# Patient Record
Sex: FEMALE | Race: WHITE | NOT HISPANIC OR LATINO | URBAN - METROPOLITAN AREA
[De-identification: names, ages, dates, MRNs, and addresses within clinical notes are randomized per-mention and may not be internally consistent; named-entity substitution may affect disease eponyms.]

---
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
## 2019-01-11 ENCOUNTER — RX RENEWAL (OUTPATIENT)
Age: 19
End: 2019-01-11

## 2019-02-06 ENCOUNTER — RX RENEWAL (OUTPATIENT)
Age: 19
End: 2019-02-06

## 2019-03-04 ENCOUNTER — RX RENEWAL (OUTPATIENT)
Age: 19
End: 2019-03-04

## 2019-04-05 ENCOUNTER — RX RENEWAL (OUTPATIENT)
Age: 19
End: 2019-04-05

## 2019-05-06 ENCOUNTER — APPOINTMENT (OUTPATIENT)
Dept: OBGYN | Facility: CLINIC | Age: 19
End: 2019-05-06

## 2019-06-25 ENCOUNTER — TRANSCRIPTION ENCOUNTER (OUTPATIENT)
Age: 19
End: 2019-06-25

## 2019-07-10 ENCOUNTER — RX RENEWAL (OUTPATIENT)
Age: 19
End: 2019-07-10

## 2019-07-19 ENCOUNTER — TRANSCRIPTION ENCOUNTER (OUTPATIENT)
Age: 19
End: 2019-07-19

## 2019-07-26 ENCOUNTER — APPOINTMENT (OUTPATIENT)
Dept: OBGYN | Facility: CLINIC | Age: 19
End: 2019-07-26
Payer: COMMERCIAL

## 2019-07-26 VITALS
SYSTOLIC BLOOD PRESSURE: 96 MMHG | DIASTOLIC BLOOD PRESSURE: 70 MMHG | WEIGHT: 130 LBS | BODY MASS INDEX: 22.2 KG/M2 | HEIGHT: 64 IN

## 2019-07-26 PROCEDURE — 99214 OFFICE O/P EST MOD 30 MIN: CPT

## 2020-01-31 ENCOUNTER — APPOINTMENT (OUTPATIENT)
Dept: OBGYN | Facility: CLINIC | Age: 20
End: 2020-01-31
Payer: COMMERCIAL

## 2020-01-31 ENCOUNTER — APPOINTMENT (OUTPATIENT)
Dept: OBGYN | Facility: CLINIC | Age: 20
End: 2020-01-31

## 2020-01-31 VITALS
SYSTOLIC BLOOD PRESSURE: 90 MMHG | BODY MASS INDEX: 22.2 KG/M2 | HEIGHT: 64 IN | DIASTOLIC BLOOD PRESSURE: 60 MMHG | WEIGHT: 130 LBS

## 2020-01-31 DIAGNOSIS — Z30.019 ENCOUNTER FOR INITIAL PRESCRIPTION OF CONTRACEPTIVES, UNSPECIFIED: ICD-10-CM

## 2020-01-31 PROCEDURE — 99213 OFFICE O/P EST LOW 20 MIN: CPT

## 2020-01-31 RX ORDER — NORGESTIMATE AND ETHINYL ESTRADIOL 0.25-0.035
0.25-35 KIT ORAL DAILY
Qty: 90 | Refills: 0 | Status: DISCONTINUED | COMMUNITY
Start: 2018-11-12 | End: 2020-01-31

## 2020-01-31 RX ORDER — NORGESTIMATE AND ETHINYL ESTRADIOL 0.25-0.035
0.25-35 KIT ORAL DAILY
Qty: 30 | Refills: 0 | Status: DISCONTINUED | COMMUNITY
Start: 2019-06-20 | End: 2020-01-31

## 2020-01-31 RX ORDER — BUPROPION HYDROCHLORIDE 300 MG/1
300 TABLET, EXTENDED RELEASE ORAL DAILY
Qty: 30 | Refills: 6 | Status: DISCONTINUED | COMMUNITY
Start: 2019-04-24 | End: 2020-01-31

## 2020-01-31 RX ORDER — FLUOXETINE HYDROCHLORIDE 20 MG/1
20 TABLET ORAL DAILY
Qty: 30 | Refills: 0 | Status: DISCONTINUED | COMMUNITY
Start: 2018-07-11 | End: 2020-01-31

## 2020-01-31 RX ORDER — FLUOXETINE HYDROCHLORIDE 20 MG/1
20 CAPSULE ORAL
Qty: 30 | Refills: 2 | Status: DISCONTINUED | COMMUNITY
Start: 2018-08-18 | End: 2020-01-31

## 2020-01-31 RX ORDER — BUPROPION HYDROCHLORIDE 300 MG/1
300 TABLET, EXTENDED RELEASE ORAL DAILY
Qty: 30 | Refills: 0 | Status: DISCONTINUED | COMMUNITY
Start: 2018-11-12 | End: 2020-01-31

## 2020-01-31 RX ORDER — ETONOGESTREL AND ETHINYL ESTRADIOL .12; .015 MG/D; MG/D
0.12-0.015 INSERT, EXTENDED RELEASE VAGINAL
Qty: 3 | Refills: 1 | Status: DISCONTINUED | COMMUNITY
Start: 2019-07-26 | End: 2020-01-31

## 2020-01-31 RX ORDER — NORGESTIMATE AND ETHINYL ESTRADIOL 0.25-0.035
0.25-35 KIT ORAL
Qty: 1 | Refills: 1 | Status: DISCONTINUED | COMMUNITY
Start: 2018-09-28 | End: 2020-01-31

## 2020-01-31 NOTE — CHIEF COMPLAINT
[Follow Up] : follow up GYN visit [FreeTextEntry1] : 20 y/o G0 with complaint that all birth control makes her pope and other annoying side effects. SHe has high anxiety about ending up with an unintended pregnancy but hates all the forms of hormonal birth control she has tried. Dr. Quintanilla had given her prozac and wellbutrin to stablize her mood but the did not work and she felt it was because of the ocp but recenlty she has been off all hormonal birth control and is seeing a counselor and is still having some depression symptoms.\par Also she has questions regarding her vaginal secretions and whether they are normal. They vary a lot in consistency and color-clear to white. She sometimes notices a bad odor and wonders if this is normal. \par She denies any itching or burning

## 2020-02-28 ENCOUNTER — TRANSCRIPTION ENCOUNTER (OUTPATIENT)
Age: 20
End: 2020-02-28

## 2020-07-14 ENCOUNTER — TRANSCRIPTION ENCOUNTER (OUTPATIENT)
Age: 20
End: 2020-07-14

## 2020-07-15 ENCOUNTER — TRANSCRIPTION ENCOUNTER (OUTPATIENT)
Age: 20
End: 2020-07-15

## 2020-07-15 ENCOUNTER — APPOINTMENT (OUTPATIENT)
Dept: OBGYN | Facility: CLINIC | Age: 20
End: 2020-07-15
Payer: COMMERCIAL

## 2020-07-15 VITALS
BODY MASS INDEX: 22.2 KG/M2 | HEIGHT: 64 IN | DIASTOLIC BLOOD PRESSURE: 60 MMHG | TEMPERATURE: 98.1 F | SYSTOLIC BLOOD PRESSURE: 98 MMHG | WEIGHT: 130 LBS

## 2020-07-15 DIAGNOSIS — N89.8 OTHER SPECIFIED NONINFLAMMATORY DISORDERS OF VAGINA: ICD-10-CM

## 2020-07-15 PROCEDURE — 99213 OFFICE O/P EST LOW 20 MIN: CPT

## 2020-07-15 RX ORDER — LEVONORGESTREL 1.5 MG/1
1.5 TABLET ORAL
Qty: 1 | Refills: 1 | Status: DISCONTINUED | COMMUNITY
Start: 2020-01-31 | End: 2020-07-15

## 2020-07-15 NOTE — CHIEF COMPLAINT
[Urgent Visit] : Urgent Visit [FreeTextEntry1] : Pt reports she had s/s of yeast infection last week, self treated with monostat last week. States she itching and burned went away but now has discharge. Denies s/s UTI today\par \par Pt reports L breast pain x 2 days, denies nipple discharge, masses. LMP 3 weeks ago

## 2020-07-15 NOTE — COUNSELING
[STD (testing, results, tx)] : STD (testing, results, tx) [Contraception] : contraception [Emergency Contraception] : emergency contraception [Vulvar Hygiene] : vulvar hygiene

## 2020-07-15 NOTE — PHYSICAL EXAM
[Awake] : awake [Acute Distress] : no acute distress [Alert] : alert [Nipple Discharge] : no nipple discharge [Tender] : no tenderness [Mass] : no breast mass [Axillary LAD] : no axillary lymphadenopathy [Depressed Mood] : not depressed [Oriented x3] : oriented to person, place, and time [Flat Affect] : affect not flat [Labia Majora] : labia major [Labia Minora] : labia minora [Scant] : there was scant vaginal bleeding [Normal] : clitoris [Motion Tenderness] : there was no cervical motion tenderness [Tenderness] : nontender [Ovarian Mass (___ Cm)] : there were no adnexal masses [Uterine Adnexae] : were not tender and not enlarged [Adnexa Tenderness] : were not tender [FreeTextEntry5] : friable

## 2020-07-23 LAB
C TRACH RRNA SPEC QL NAA+PROBE: NOT DETECTED
CANDIDA VAG CYTO: NOT DETECTED
G VAGINALIS+PREV SP MTYP VAG QL MICRO: NOT DETECTED
N GONORRHOEA RRNA SPEC QL NAA+PROBE: NOT DETECTED
SOURCE AMPLIFICATION: NORMAL
T VAGINALIS VAG QL WET PREP: NOT DETECTED

## 2020-10-15 ENCOUNTER — APPOINTMENT (OUTPATIENT)
Dept: OBGYN | Facility: CLINIC | Age: 20
End: 2020-10-15
Payer: COMMERCIAL

## 2020-10-15 VITALS
DIASTOLIC BLOOD PRESSURE: 58 MMHG | HEIGHT: 64 IN | SYSTOLIC BLOOD PRESSURE: 100 MMHG | WEIGHT: 140 LBS | BODY MASS INDEX: 23.9 KG/M2

## 2020-10-15 PROCEDURE — 99395 PREV VISIT EST AGE 18-39: CPT

## 2020-10-15 NOTE — DISCUSSION/SUMMARY
[FreeTextEntry1] : Pap today\par Rev'd condoms for birth control, STI protection.\par Pt to rto in 1 year or sooner prn

## 2020-10-15 NOTE — PHYSICAL EXAM
[Appropriately responsive] : appropriately responsive [Alert] : alert [No Acute Distress] : no acute distress [No Lymphadenopathy] : no lymphadenopathy [Regular Rate Rhythm] : regular rate rhythm [No Murmurs] : no murmurs [Soft] : soft [Clear to Auscultation B/L] : clear to auscultation bilaterally [Non-tender] : non-tender [Non-distended] : non-distended [No HSM] : No HSM [Oriented x3] : oriented x3 [Examination Of The Breasts] : a normal appearance [No Masses] : no breast masses were palpable [Labia Majora] : normal [Labia Minora] : normal [Uterine Adnexae] : normal [Normal] : normal

## 2020-10-15 NOTE — REASON FOR VISIT
[Annual] : an annual visit. [FreeTextEntry2] : Pt reports thin vaginal dischrge, clear, pt denies itching, burning, odor, no new sexual partners

## 2020-10-21 LAB — CYTOLOGY CVX/VAG DOC THIN PREP: ABNORMAL

## 2020-12-04 ENCOUNTER — OUTPATIENT (OUTPATIENT)
Dept: OUTPATIENT SERVICES | Facility: HOSPITAL | Age: 20
LOS: 1 days | End: 2020-12-04

## 2020-12-10 ENCOUNTER — TRANSCRIPTION ENCOUNTER (OUTPATIENT)
Age: 20
End: 2020-12-10

## 2020-12-14 DIAGNOSIS — Z01.818 ENCOUNTER FOR OTHER PREPROCEDURAL EXAMINATION: ICD-10-CM

## 2020-12-15 ENCOUNTER — APPOINTMENT (OUTPATIENT)
Dept: DISASTER EMERGENCY | Facility: CLINIC | Age: 20
End: 2020-12-15

## 2020-12-18 ENCOUNTER — OUTPATIENT (OUTPATIENT)
Dept: OUTPATIENT SERVICES | Facility: HOSPITAL | Age: 20
LOS: 1 days | End: 2020-12-18

## 2020-12-21 ENCOUNTER — TRANSCRIPTION ENCOUNTER (OUTPATIENT)
Age: 20
End: 2020-12-21

## 2021-02-21 ENCOUNTER — TRANSCRIPTION ENCOUNTER (OUTPATIENT)
Age: 21
End: 2021-02-21

## 2021-08-26 ENCOUNTER — APPOINTMENT (OUTPATIENT)
Dept: OBGYN | Facility: CLINIC | Age: 21
End: 2021-08-26
Payer: COMMERCIAL

## 2021-08-26 ENCOUNTER — NON-APPOINTMENT (OUTPATIENT)
Age: 21
End: 2021-08-26

## 2021-08-26 VITALS
HEIGHT: 64 IN | SYSTOLIC BLOOD PRESSURE: 116 MMHG | DIASTOLIC BLOOD PRESSURE: 82 MMHG | WEIGHT: 140 LBS | BODY MASS INDEX: 23.9 KG/M2

## 2021-08-26 DIAGNOSIS — Z87.42 PERSONAL HISTORY OF OTHER DISEASES OF THE FEMALE GENITAL TRACT: ICD-10-CM

## 2021-08-26 DIAGNOSIS — Z30.09 ENCOUNTER FOR OTHER GENERAL COUNSELING AND ADVICE ON CONTRACEPTION: ICD-10-CM

## 2021-08-26 DIAGNOSIS — R68.82 DECREASED LIBIDO: ICD-10-CM

## 2021-08-26 PROCEDURE — 99213 OFFICE O/P EST LOW 20 MIN: CPT

## 2021-08-26 NOTE — PLAN
[FreeTextEntry1] : Pt reports she had used plan B after unprotected intercourse, usually uses condoms consistently. Discussed reliable birth control method. Pt declines at this time, also discussed worry about pregnancy can have negative effect on libido.\par Long discussion regarding libido, pt reports her libido decreased when she started depression medication x 2 years ago but did not recover after stopping medication. I acknowledged this could be possible but hard to confirm, pt see PCP next week.\par Also discussed emotional arousal and physical arousal, suggest to try stimulation.\par Acupuncture also suggestion.\par RTO in 1 year or sooner prn.\par \par Vania Alvarez CM

## 2021-08-26 NOTE — HISTORY OF PRESENT ILLNESS
[Yes] : Patient has concerns regarding sex [Men] : men [PapSmeardate] : 2020 [TextBox_31] : inflammation [FreeTextEntry1] : decreased libido

## 2021-08-26 NOTE — REASON FOR VISIT
[Follow-Up] : a follow-up evaluation of [FreeTextEntry2] : Pt reports decreased libido and repeat pap, inflammation on NILM pap 10/2020

## 2021-08-26 NOTE — PHYSICAL EXAM
[Appropriately responsive] : appropriately responsive [Alert] : alert [No Acute Distress] : no acute distress [Oriented x3] : oriented x3 [Labia Majora] : normal [Labia Minora] : normal [No Bleeding] : There was no active vaginal bleeding [Normal] : normal [Uterine Adnexae] : normal [FreeTextEntry5] : friable

## 2021-09-02 LAB — CYTOLOGY CVX/VAG DOC THIN PREP: NORMAL

## 2021-09-16 ENCOUNTER — TRANSCRIPTION ENCOUNTER (OUTPATIENT)
Age: 21
End: 2021-09-16

## 2021-10-22 ENCOUNTER — APPOINTMENT (OUTPATIENT)
Dept: OBGYN | Facility: CLINIC | Age: 21
End: 2021-10-22
Payer: COMMERCIAL

## 2021-10-22 VITALS
SYSTOLIC BLOOD PRESSURE: 112 MMHG | HEIGHT: 64 IN | BODY MASS INDEX: 23.9 KG/M2 | DIASTOLIC BLOOD PRESSURE: 76 MMHG | WEIGHT: 140 LBS

## 2021-10-22 DIAGNOSIS — B96.89 ACUTE VAGINITIS: ICD-10-CM

## 2021-10-22 DIAGNOSIS — R30.0 DYSURIA: ICD-10-CM

## 2021-10-22 DIAGNOSIS — N76.0 ACUTE VAGINITIS: ICD-10-CM

## 2021-10-22 DIAGNOSIS — Z11.3 ENCOUNTER FOR SCREENING FOR INFECTIONS WITH A PREDOMINANTLY SEXUAL MODE OF TRANSMISSION: ICD-10-CM

## 2021-10-22 LAB
BILIRUB UR QL STRIP: NORMAL
CLARITY UR: CLEAR
COLLECTION METHOD: NORMAL
GLUCOSE UR-MCNC: NORMAL
HCG UR QL: 0.2 EU/DL
HGB UR QL STRIP.AUTO: NORMAL
KETONES UR-MCNC: NORMAL
LEUKOCYTE ESTERASE UR QL STRIP: NORMAL
NITRITE UR QL STRIP: NORMAL
PH UR STRIP: 5.5
PROT UR STRIP-MCNC: NORMAL
SP GR UR STRIP: <=1.005

## 2021-10-22 PROCEDURE — 99213 OFFICE O/P EST LOW 20 MIN: CPT

## 2021-10-22 RX ORDER — METRONIDAZOLE 7.5 MG/G
0.75 GEL VAGINAL
Qty: 1 | Refills: 1 | Status: ACTIVE | COMMUNITY
Start: 2021-10-22 | End: 1900-01-01

## 2021-10-22 NOTE — CHIEF COMPLAINT
[Urgent Visit] : Urgent Visit [FreeTextEntry1] : Pt reports vaginal itching x 4 days, used OTC monostat x 3 days but itching increased.\par Pt states she also left tampon in on the last day of her menses for over 9 hours.\par

## 2021-10-22 NOTE — HISTORY OF PRESENT ILLNESS
[Burning] : burning [Itching] : itching [Discharge] : discharge [] : the vaginal discharge is described as [Rt Vulva] : right vulva [Lt Vulva] : left vulva [Vagina] : vagina [Sexually Active] : is sexually active [Monogamous] : is monogamous [Male ___] : [unfilled] male [Mass] : no mass [Lesion] : no lesion [Soreness] : no soreness [Hx HSV] : no herpes simplex [Contraception] : does not use contraception

## 2021-10-22 NOTE — COUNSELING
[Exercise] : exercise [Vitamins/Supplements] : vitamins/supplements [STD (testing, results, tx)] : STD (testing, results, tx) [Contraception] : contraception [Emergency Contraception] : emergency contraception [Safe Sexual Practices] : safe sexual practices [Vulvar Hygiene] : vulvar hygiene

## 2021-10-22 NOTE — PHYSICAL EXAM
[No Lesions] : no genitalia lesions [Vulvitis] : vulvitis [Labia Majora Erythema] : erythema of the labia majora [Labia Minora Erythema] : erythema of the labia minora [Normal] : clitoris [Discharge] : a  ~M vaginal discharge was present [Scant] : scant [Foul Smelling] : foul smelling [Rosa Elena] : yellow [Thin] : thin [No Bleeding] : there was no active vaginal bleeding [Uterine Adnexae] : were not tender and not enlarged [Motion Tenderness] : there was no cervical motion tenderness [Tenderness] : nontender [Adnexa Tenderness] : were not tender

## 2021-11-05 ENCOUNTER — APPOINTMENT (OUTPATIENT)
Dept: OBGYN | Facility: CLINIC | Age: 21
End: 2021-11-05

## 2021-11-08 ENCOUNTER — APPOINTMENT (OUTPATIENT)
Dept: OBGYN | Facility: CLINIC | Age: 21
End: 2021-11-08

## 2021-11-08 ENCOUNTER — APPOINTMENT (OUTPATIENT)
Dept: OBGYN | Facility: CLINIC | Age: 21
End: 2021-11-08
Payer: COMMERCIAL

## 2021-11-08 VITALS
SYSTOLIC BLOOD PRESSURE: 122 MMHG | BODY MASS INDEX: 23.9 KG/M2 | HEIGHT: 64 IN | WEIGHT: 140 LBS | DIASTOLIC BLOOD PRESSURE: 78 MMHG

## 2021-11-08 DIAGNOSIS — N94.9 UNSPECIFIED CONDITION ASSOCIATED WITH FEMALE GENITAL ORGANS AND MENSTRUAL CYCLE: ICD-10-CM

## 2021-11-08 LAB
BILIRUB UR QL STRIP: NORMAL
CLARITY UR: CLEAR
COLLECTION METHOD: NORMAL
GLUCOSE UR-MCNC: NORMAL
HCG UR QL: 0.2 EU/DL
HCG UR QL: NEGATIVE
HGB UR QL STRIP.AUTO: NORMAL
KETONES UR-MCNC: NORMAL
LEUKOCYTE ESTERASE UR QL STRIP: NORMAL
NITRITE UR QL STRIP: NORMAL
PH UR STRIP: 5.5
PROT UR STRIP-MCNC: NORMAL
QUALITY CONTROL: NORMAL
SP GR UR STRIP: 1.03

## 2021-11-08 PROCEDURE — 99213 OFFICE O/P EST LOW 20 MIN: CPT

## 2021-11-08 RX ORDER — PREDNISONE 20 MG/1
20 TABLET ORAL
Qty: 10 | Refills: 0 | Status: ACTIVE | COMMUNITY
Start: 2021-09-16

## 2021-11-08 RX ORDER — FLUCONAZOLE 150 MG/1
150 TABLET ORAL
Qty: 1 | Refills: 0 | Status: ACTIVE | COMMUNITY
Start: 2021-11-08 | End: 1900-01-01

## 2021-11-08 NOTE — DISCUSSION/SUMMARY
[FreeTextEntry1] : ANother BD Affirm being sent-this time pt has placed nothing in vagina for several days\par txing with one Diflucan tablet

## 2021-11-08 NOTE — HISTORY OF PRESENT ILLNESS
[FreeTextEntry1] : Pt was seen 10/22 for vaginal burning she had self treated herself with Monistat 3 before that appointment. She then was treated presumptively with Metrogel and now feels itchy externally only\par BD Affirm was negative

## 2021-11-08 NOTE — PHYSICAL EXAM
[Labia Majora] : normal [Normal] : normal [FreeTextEntry1] : mild external irritation [FreeTextEntry4] : thin brown secretions-pt due to get menses

## 2021-11-10 LAB
CANDIDA VAG CYTO: NOT DETECTED
G VAGINALIS+PREV SP MTYP VAG QL MICRO: NOT DETECTED
T VAGINALIS VAG QL WET PREP: NOT DETECTED

## 2021-11-11 ENCOUNTER — NON-APPOINTMENT (OUTPATIENT)
Age: 21
End: 2021-11-11

## 2021-12-27 ENCOUNTER — TRANSCRIPTION ENCOUNTER (OUTPATIENT)
Age: 21
End: 2021-12-27

## 2022-03-11 ENCOUNTER — NON-APPOINTMENT (OUTPATIENT)
Age: 22
End: 2022-03-11

## 2022-03-11 ENCOUNTER — TRANSCRIPTION ENCOUNTER (OUTPATIENT)
Age: 22
End: 2022-03-11

## 2022-04-21 ENCOUNTER — APPOINTMENT (OUTPATIENT)
Dept: OBGYN | Facility: CLINIC | Age: 22
End: 2022-04-21
Payer: COMMERCIAL

## 2022-04-21 VITALS
HEIGHT: 64 IN | DIASTOLIC BLOOD PRESSURE: 80 MMHG | BODY MASS INDEX: 23.22 KG/M2 | WEIGHT: 136 LBS | SYSTOLIC BLOOD PRESSURE: 120 MMHG

## 2022-04-21 DIAGNOSIS — R68.82 DECREASED LIBIDO: ICD-10-CM

## 2022-04-21 DIAGNOSIS — N92.3 OVULATION BLEEDING: ICD-10-CM

## 2022-04-21 LAB
HCG UR QL: NEGATIVE
QUALITY CONTROL: YES

## 2022-04-21 PROCEDURE — 99214 OFFICE O/P EST MOD 30 MIN: CPT

## 2022-04-21 NOTE — HISTORY OF PRESENT ILLNESS
[FreeTextEntry1] : 20 y/o familiar to me presents with two issues\par 1) IMB-pt reports she got menses predictably monthly until November when suddenly it was coming late-up to 11 days and then a couple of months she reports she got it q 2 weeks. She noticed that this started after she received the COIVD vaccine in September and OCtober. She has not gotten the booster and is reluctant to do so because of this IMB. She has been taking multiple pregnancy tests during this time and all have been negative. She  uses condoms for birth control only- declines other methods-does not like ocps\par 2) decreased libido ongoing for over 4 years now. Reports she never had a strong sex drive but now she has none. She reports that Dr. AMARAL put her on Prozac for it which made it worse and the Wellbutrin which didn’t help either however she thinks she may not have been on it long enough\par She admits that she is possibly depressed. Had been seeing a psychologist but when COVID came she found the distance counseling was not helping her so she DC'd. She functions but is not happy,finishes college in 8 months and has a lot of stress. Denies SI/HI.\par She denies relationship issues with her partner\par She does not have poor self body image\par She is not on any medications

## 2022-04-21 NOTE — DISCUSSION/SUMMARY
[FreeTextEntry1] : 21 y.o with 2 issues\par 1) IMB for 5 months, never occurred before with normal physical exam\par -rx'd pelvic sono\par -pt has seen primary MD and thyroid is wnl\par 2)decreased libido\par -advised to try [employer] to explore reasons for low libido and possible ways to remedy\par -start Wellbutrin 150 mg QD- 3 months sent\par Pt will return for f/u visit with possible medication increase if needed\par Total time spent 30 minutes of which 25 was counseling

## 2022-04-21 NOTE — PHYSICAL EXAM
[Chaperone Declined] : Patient declined chaperone [Labia Majora] : normal [Labia Minora] : normal [Normal] : normal [Normal Position] : in a normal position [Uterine Adnexae] : normal

## 2022-04-25 ENCOUNTER — APPOINTMENT (OUTPATIENT)
Dept: OBGYN | Facility: CLINIC | Age: 22
End: 2022-04-25
Payer: COMMERCIAL

## 2022-04-25 ENCOUNTER — ASOB RESULT (OUTPATIENT)
Age: 22
End: 2022-04-25

## 2022-04-25 PROCEDURE — 76856 US EXAM PELVIC COMPLETE: CPT | Mod: 59

## 2022-04-25 PROCEDURE — 76830 TRANSVAGINAL US NON-OB: CPT

## 2022-06-29 RX ORDER — BUPROPION HYDROCHLORIDE 150 MG/1
150 TABLET, EXTENDED RELEASE ORAL DAILY
Qty: 90 | Refills: 3 | Status: ACTIVE | COMMUNITY
Start: 2022-04-21 | End: 1900-01-01

## 2022-10-14 ENCOUNTER — NON-APPOINTMENT (OUTPATIENT)
Age: 22
End: 2022-10-14

## 2022-10-14 ENCOUNTER — APPOINTMENT (OUTPATIENT)
Dept: NEUROLOGY | Facility: CLINIC | Age: 22
End: 2022-10-14
Payer: COMMERCIAL

## 2022-10-14 VITALS
SYSTOLIC BLOOD PRESSURE: 123 MMHG | HEART RATE: 118 BPM | BODY MASS INDEX: 22.49 KG/M2 | TEMPERATURE: 98.7 F | DIASTOLIC BLOOD PRESSURE: 88 MMHG | WEIGHT: 135 LBS | OXYGEN SATURATION: 98 % | HEIGHT: 65 IN

## 2022-10-14 DIAGNOSIS — R51.9 HEADACHE, UNSPECIFIED: ICD-10-CM

## 2022-10-14 DIAGNOSIS — E34.8 OTHER SPECIFIED ENDOCRINE DISORDERS: ICD-10-CM

## 2022-10-14 DIAGNOSIS — Z80.51 FAMILY HISTORY OF MALIGNANT NEOPLASM OF KIDNEY: ICD-10-CM

## 2022-10-14 DIAGNOSIS — Z82.3 FAMILY HISTORY OF STROKE: ICD-10-CM

## 2022-10-14 DIAGNOSIS — Z78.9 OTHER SPECIFIED HEALTH STATUS: ICD-10-CM

## 2022-10-14 DIAGNOSIS — Z82.49 FAMILY HISTORY OF ISCHEMIC HEART DISEASE AND OTHER DISEASES OF THE CIRCULATORY SYSTEM: ICD-10-CM

## 2022-10-14 PROCEDURE — 99204 OFFICE O/P NEW MOD 45 MIN: CPT

## 2022-10-14 RX ORDER — METOCLOPRAMIDE 10 MG/1
10 TABLET ORAL
Qty: 1 | Refills: 0 | Status: ACTIVE | COMMUNITY
Start: 2022-10-14 | End: 1900-01-01

## 2022-10-14 RX ORDER — DIVALPROEX SODIUM 500 MG/1
500 TABLET, DELAYED RELEASE ORAL
Qty: 1 | Refills: 0 | Status: ACTIVE | COMMUNITY
Start: 2022-10-14 | End: 1900-01-01

## 2022-10-14 RX ORDER — DEXAMETHASONE 4 MG/1
4 TABLET ORAL
Qty: 1 | Refills: 0 | Status: ACTIVE | COMMUNITY
Start: 2022-10-14 | End: 1900-01-01

## 2022-10-14 RX ORDER — DEXAMETHASONE 6 MG/1
6 TABLET ORAL
Qty: 1 | Refills: 0 | Status: ACTIVE | COMMUNITY
Start: 2022-10-14 | End: 1900-01-01

## 2022-10-14 NOTE — HISTORY OF PRESENT ILLNESS
[FreeTextEntry1] : Ms. Sanchez is a 22 year-old woman with PMH depression, pineal cyst who presents to the office today for evaluation of headaches. MRI brain w/wo performed in January 2016 demonstrated a Pineal gland and was otherwise unremarkable. She reports history of headaches and migraines but reports worsening severity and frequency over the past 2.5 weeks. She states that she experiences a headache almost every day with severe, migraine like headaches occurring about 4x/week. She is unable to adequately characterize the pain but states that headaches generally occur either in the front or back of her head and are associated with light sensitivity, sound sensitivity, dizziness and when headaches are severe, nausea and vomiting. She denies vision changes or aura.  She is using OTC NSAIDs and Tylenol with little relief. She was prescribed a medication by a previous neurologist that caused her to feel like her throat was swollen but does not remember the name. She denies any other new or concerning neurological symptoms. \par

## 2022-10-14 NOTE — REVIEW OF SYSTEMS
[Dizziness] : dizziness [Fever] : no fever [Chills] : no chills [Confused or Disoriented] : no confusion [Memory Lapses or Loss] : no memory loss [Decr. Concentrating Ability] : no decrease in concentrating ability [Difficulty with Language] : no ~M difficulty with language [Facial Weakness] : no facial weakness [Arm Weakness] : no arm weakness [Hand Weakness] : no hand weakness [Leg Weakness] : no leg weakness [Poor Coordination] : good coordination [Numbness] : no numbness [Tingling] : no tingling [Difficulty Walking] : no difficulty walking [Frequent Falls] : not falling [Anxiety] : no anxiety [Depression] : no depression [Eye Pain] : no eye pain [Eyesight Problems] : no eyesight problems [Earache] : no earache [Loss Of Hearing] : no hearing loss [Chest Pain] : no chest pain [Palpitations] : no palpitations [Shortness Of Breath] : no shortness of breath [Cough] : no cough

## 2022-10-14 NOTE — DISCUSSION/SUMMARY
[FreeTextEntry1] : Ms. Sanchez is a 22 year-old woman with PMH depression, pineal cyst who presents to the office today for evaluation of headaches, which are presumably migrainous. Due to patients symptoms and hx of pineal cyst, I recommend performing MRI head to r/o intracranial pathology as cause of increased frequency and severity of headaches. We discussed treatment options of abortive therapy and preventative therapy and addressed the importance of limiting abortive treatments to no more than 3x/week to prevent medication overuse headaches. Take Reglan 10mg, VPA 500mg and Decadron 10mg x 1 for status migrainosus. Begin rizatriptan 10mg PRN for migraines. We will readdress in 1-2 weeks after these medications are taken and imaging is performed to see if prophylaxis is needed. Would consider propranolol for migraine prophylaxis if needed. Follow-up with me in about 1 month. All of her questions and concerns were addressed. \par

## 2022-10-18 ENCOUNTER — RX RENEWAL (OUTPATIENT)
Age: 22
End: 2022-10-18

## 2022-10-18 RX ORDER — BUPROPION HYDROCHLORIDE 300 MG/1
300 TABLET, EXTENDED RELEASE ORAL DAILY
Qty: 90 | Refills: 0 | Status: ACTIVE | COMMUNITY
Start: 2022-07-27 | End: 1900-01-01

## 2022-10-21 ENCOUNTER — APPOINTMENT (OUTPATIENT)
Dept: MRI IMAGING | Facility: CLINIC | Age: 22
End: 2022-10-21

## 2022-10-21 PROCEDURE — 70553 MRI BRAIN STEM W/O & W/DYE: CPT

## 2022-11-23 ENCOUNTER — APPOINTMENT (OUTPATIENT)
Dept: NEUROLOGY | Facility: CLINIC | Age: 22
End: 2022-11-23
Payer: COMMERCIAL

## 2022-11-23 DIAGNOSIS — G43.909 MIGRAINE, UNSPECIFIED, NOT INTRACTABLE, W/OUT STATUS MIGRAINOSUS: ICD-10-CM

## 2022-11-23 PROCEDURE — 99213 OFFICE O/P EST LOW 20 MIN: CPT | Mod: 95

## 2022-11-23 NOTE — DISCUSSION/SUMMARY
[FreeTextEntry1] : Ms. Sanchez is a 22 year-old woman with PMH depression, pineal cyst who presents to the office today via Telehealth for follow-up of migraines. MRI brain unremarkable. We again discussed treatment options of abortive therapy and preventative therapy and addressed the importance of limiting abortive treatments to no more than 3x/week to prevent medication overuse headaches. Plan to decrease rizatriptan from 10mg to 5mg to mitigate medication side effects. She was asked to let me know if this improves side effects or if this makes medications ineffective for headaches. Follow-up with me in about 3 months. All of her questions and concerns were addressed. \par

## 2022-11-23 NOTE — HISTORY OF PRESENT ILLNESS
[FreeTextEntry1] : INTERIM HISTORY: Since her last visit, Ms. Sanchez has been well. She had moderate relief from headaches after migraine cocktail. She reports about 1-2 breakthrough headaches a week and migraines are relieved with rizatriptan. She reports sensation of lightheadedness/dizziness after use of rizatriptan. She also reports dizziness with position changes (laying in bed). MRI brain was unremarkable. She denies any other new or concerning neurological symptoms. \par \par INITIAL OFFICE VISIT 10/14/22: Ms. Sanchez is a 22 year-old woman with PMH depression, pineal cyst who presents to the office today for evaluation of headaches. MRI brain w/wo performed in January 2016 demonstrated a Pineal gland and was otherwise unremarkable. She reports history of headaches and migraines but reports worsening severity and frequency over the past 2.5 weeks. She states that she experiences a headache almost every day with severe, migraine like headaches occurring about 4x/week. She is unable to adequately characterize the pain but states that headaches generally occur either in the front or back of her head and are associated with light sensitivity, sound sensitivity, dizziness and when headaches are severe, nausea and vomiting. She denies vision changes or aura.  She is using OTC NSAIDs and Tylenol with little relief. She was prescribed a medication by a previous neurologist that caused her to feel like her throat was swollen but does not remember the name. She denies any other new or concerning neurological symptoms. \par

## 2023-02-09 ENCOUNTER — APPOINTMENT (OUTPATIENT)
Dept: OBGYN | Facility: CLINIC | Age: 23
End: 2023-02-09
Payer: COMMERCIAL

## 2023-02-09 VITALS
HEIGHT: 65 IN | DIASTOLIC BLOOD PRESSURE: 78 MMHG | BODY MASS INDEX: 21.66 KG/M2 | WEIGHT: 130 LBS | SYSTOLIC BLOOD PRESSURE: 123 MMHG

## 2023-02-09 DIAGNOSIS — E04.9 NONTOXIC GOITER, UNSPECIFIED: ICD-10-CM

## 2023-02-09 DIAGNOSIS — F32.A DEPRESSION, UNSPECIFIED: ICD-10-CM

## 2023-02-09 DIAGNOSIS — Z00.00 ENCOUNTER FOR GENERAL ADULT MEDICAL EXAMINATION W/OUT ABNORMAL FINDINGS: ICD-10-CM

## 2023-02-09 PROCEDURE — 99395 PREV VISIT EST AGE 18-39: CPT

## 2023-02-09 NOTE — DISCUSSION/SUMMARY
[FreeTextEntry1] : normal annual exam with possible enlarged thyroid-rx'd thyroid sono\par Offered to check thyroid function and vitamin D since abnormal values can cause depression but she declined stating she faints if she gets blood drawn\par Recommended vit d supplement and exercise\par Given recommendations for possible providers and advised to try and get a psych NP to help her with medication and counseling\par Pap collected\par CHanged her antidepressant from Wellbutrin to Effexor-discussed tapering off the WEllbutrin and then adding the Effexor. She verbalized understanding

## 2023-02-09 NOTE — PHYSICAL EXAM
[Chaperone Declined] : Patient declined chaperone [Appropriately responsive] : appropriately responsive [Alert] : alert [No Acute Distress] : no acute distress [No Lymphadenopathy] : no lymphadenopathy [No Murmurs] : no murmurs [Soft] : soft [Non-tender] : non-tender [Non-distended] : non-distended [No HSM] : No HSM [No Lesions] : no lesions [No Mass] : no mass [Oriented x3] : oriented x3 [FreeTextEntry3] : enlarged thyroid [Examination Of The Breasts] : a normal appearance [No Masses] : no breast masses were palpable [Labia Majora] : normal [Labia Minora] : normal [Normal] : normal [Uterine Adnexae] : normal

## 2023-02-09 NOTE — HISTORY OF PRESENT ILLNESS
[FreeTextEntry1] : 21 y/o familiar to me presents with complaint of depression not being remedied with Wellbutrin any more. She is currently on 150 mg since she ran out of the 300 mg.  Denies any SI/HI. Has been on Prozac in the past but it decreased her libido. She has seen a therapist previously but is unable to find anyone who can care for her that her insurance will cover. She was told she should only see a psychiatrist so she can be prescribed medication\par Pap was NILM in 2021\par Getting  next April-uses condoms for birth control

## 2023-02-10 LAB
C TRACH RRNA SPEC QL NAA+PROBE: NOT DETECTED
HPV HIGH+LOW RISK DNA PNL CVX: NOT DETECTED
N GONORRHOEA RRNA SPEC QL NAA+PROBE: NOT DETECTED
SOURCE TP AMPLIFICATION: NORMAL

## 2023-02-14 ENCOUNTER — APPOINTMENT (OUTPATIENT)
Dept: ULTRASOUND IMAGING | Facility: CLINIC | Age: 23
End: 2023-02-14
Payer: COMMERCIAL

## 2023-02-14 LAB — CYTOLOGY CVX/VAG DOC THIN PREP: NORMAL

## 2023-02-14 PROCEDURE — 76536 US EXAM OF HEAD AND NECK: CPT

## 2023-02-16 DIAGNOSIS — E04.1 NONTOXIC SINGLE THYROID NODULE: ICD-10-CM

## 2023-07-07 ENCOUNTER — NON-APPOINTMENT (OUTPATIENT)
Age: 23
End: 2023-07-07

## 2023-07-11 ENCOUNTER — APPOINTMENT (OUTPATIENT)
Dept: ULTRASOUND IMAGING | Facility: CLINIC | Age: 23
End: 2023-07-11
Payer: COMMERCIAL

## 2023-07-11 PROCEDURE — 76536 US EXAM OF HEAD AND NECK: CPT

## 2023-07-27 ENCOUNTER — NON-APPOINTMENT (OUTPATIENT)
Age: 23
End: 2023-07-27

## 2023-07-27 ENCOUNTER — APPOINTMENT (OUTPATIENT)
Dept: OPHTHALMOLOGY | Facility: CLINIC | Age: 23
End: 2023-07-27
Payer: COMMERCIAL

## 2023-07-27 PROCEDURE — 92004 COMPRE OPH EXAM NEW PT 1/>: CPT

## 2023-11-08 ENCOUNTER — RX RENEWAL (OUTPATIENT)
Age: 23
End: 2023-11-08

## 2023-11-08 RX ORDER — VENLAFAXINE 37.5 MG/1
37.5 TABLET ORAL DAILY
Qty: 90 | Refills: 2 | Status: ACTIVE | COMMUNITY
Start: 2023-02-09 | End: 1900-01-01

## 2023-11-10 RX ORDER — RIZATRIPTAN BENZOATE 5 MG/1
5 TABLET ORAL
Qty: 16 | Refills: 2 | Status: ACTIVE | COMMUNITY
Start: 2022-10-14 | End: 1900-01-01

## 2023-11-14 ENCOUNTER — NON-APPOINTMENT (OUTPATIENT)
Age: 23
End: 2023-11-14

## 2023-12-03 ENCOUNTER — NON-APPOINTMENT (OUTPATIENT)
Age: 23
End: 2023-12-03

## 2023-12-17 ENCOUNTER — NON-APPOINTMENT (OUTPATIENT)
Age: 23
End: 2023-12-17

## 2023-12-19 ENCOUNTER — APPOINTMENT (OUTPATIENT)
Dept: ULTRASOUND IMAGING | Facility: CLINIC | Age: 23
End: 2023-12-19
Payer: COMMERCIAL

## 2023-12-19 PROCEDURE — 76700 US EXAM ABDOM COMPLETE: CPT

## 2024-03-14 ENCOUNTER — APPOINTMENT (OUTPATIENT)
Dept: OBGYN | Facility: CLINIC | Age: 24
End: 2024-03-14
Payer: COMMERCIAL

## 2024-03-14 VITALS
WEIGHT: 150 LBS | SYSTOLIC BLOOD PRESSURE: 122 MMHG | HEIGHT: 65 IN | BODY MASS INDEX: 24.99 KG/M2 | DIASTOLIC BLOOD PRESSURE: 82 MMHG

## 2024-03-14 DIAGNOSIS — Z01.419 ENCOUNTER FOR GYNECOLOGICAL EXAMINATION (GENERAL) (ROUTINE) W/OUT ABNORMAL FINDINGS: ICD-10-CM

## 2024-03-14 LAB
HCG UR QL: NEGATIVE
QUALITY CONTROL: YES

## 2024-03-14 PROCEDURE — 99395 PREV VISIT EST AGE 18-39: CPT

## 2024-03-14 NOTE — PHYSICAL EXAM
[Alert] : alert [Appropriately responsive] : appropriately responsive [No Acute Distress] : no acute distress [No Lymphadenopathy] : no lymphadenopathy [Thyroid Nodule] : thyroid nodule [Regular Rate Rhythm] : regular rate rhythm [Clear to Auscultation B/L] : clear to auscultation bilaterally [No Murmurs] : no murmurs [Soft] : soft [Non-distended] : non-distended [Non-tender] : non-tender [No HSM] : No HSM [No Lesions] : no lesions [Oriented x3] : oriented x3 [No Mass] : no mass [FreeTextEntry3] : small nodule palpated upper left left [Examination Of The Breasts] : a normal appearance [No Masses] : no breast masses were palpable [Labia Majora] : normal [Labia Minora] : normal [Uterine Adnexae] : normal [Normal] : normal

## 2024-03-14 NOTE — PLAN
[FreeTextEntry1] : Normal CBE and SBE taught and encouraged to perform monthly Normal pelvic exam Pap collected GC/CT collected To follow up with PCP re: thyroid nodule Healthy diet,exercise and sleep hygiene discussed

## 2024-03-14 NOTE — HISTORY OF PRESENT ILLNESS
[Patient reported PAP Smear was normal] : Patient reported PAP Smear was normal [Chlamydia test offered] : Chlamydia test offered [Gonorrhea test offered] : Gonorrhea test offered [Condoms] : uses condoms [Y] : Patient is sexually active [N] : Patient denies prior pregnancies [TextBox_4] : 22 y/o for well woman exam today. Sexually active and uses condoms only. Hx depression and started on Effexor by Sonny Cook last year.  Noted to have enlarged thyroid, US  showed 6 mm nodule and 6 month f/u recommended. Patient states that results from latter were reported as normal.  Getting  in 6 weeks. She is an  in [place] [PapSmeardate] : 2/23 [LMPDate] : 2/27/24

## 2024-03-15 LAB
C TRACH RRNA SPEC QL NAA+PROBE: NOT DETECTED
N GONORRHOEA RRNA SPEC QL NAA+PROBE: NOT DETECTED
SOURCE TP AMPLIFICATION: NORMAL

## 2024-03-18 LAB — CYTOLOGY CVX/VAG DOC THIN PREP: NORMAL

## 2024-04-02 DIAGNOSIS — N91.0 PRIMARY AMENORRHEA: ICD-10-CM

## 2024-04-02 DIAGNOSIS — N94.89 OTHER SPECIFIED CONDITIONS ASSOCIATED WITH FEMALE GENITAL ORGANS AND MENSTRUAL CYCLE: ICD-10-CM

## 2024-04-02 RX ORDER — NORETHINDRONE ACETATE 5 MG/1
5 TABLET ORAL 3 TIMES DAILY
Qty: 60 | Refills: 0 | Status: ACTIVE | COMMUNITY
Start: 2024-04-02 | End: 1900-01-01

## 2024-07-16 ENCOUNTER — NON-APPOINTMENT (OUTPATIENT)
Age: 24
End: 2024-07-16